# Patient Record
Sex: FEMALE | Race: AMERICAN INDIAN OR ALASKA NATIVE | Employment: FULL TIME | URBAN - METROPOLITAN AREA
[De-identification: names, ages, dates, MRNs, and addresses within clinical notes are randomized per-mention and may not be internally consistent; named-entity substitution may affect disease eponyms.]

---

## 2020-11-29 LAB
HPV ABSTRACT: NORMAL
PAP-ABSTRACT: NORMAL

## 2020-12-29 ENCOUNTER — TRANSFERRED RECORDS (OUTPATIENT)
Dept: HEALTH INFORMATION MANAGEMENT | Facility: CLINIC | Age: 65
End: 2020-12-29

## 2021-01-20 ENCOUNTER — OFFICE VISIT (OUTPATIENT)
Dept: OBGYN | Facility: CLINIC | Age: 66
End: 2021-01-20
Payer: COMMERCIAL

## 2021-01-20 VITALS
HEART RATE: 72 BPM | WEIGHT: 103 LBS | DIASTOLIC BLOOD PRESSURE: 74 MMHG | HEIGHT: 60 IN | BODY MASS INDEX: 20.22 KG/M2 | SYSTOLIC BLOOD PRESSURE: 118 MMHG

## 2021-01-20 DIAGNOSIS — B97.7 HPV IN FEMALE: Primary | ICD-10-CM

## 2021-01-20 DIAGNOSIS — Z12.4 CERVICAL CANCER SCREENING: ICD-10-CM

## 2021-01-20 PROCEDURE — 99202 OFFICE O/P NEW SF 15 MIN: CPT | Performed by: OBSTETRICS & GYNECOLOGY

## 2021-01-20 PROCEDURE — 87624 HPV HI-RISK TYP POOLED RSLT: CPT | Performed by: OBSTETRICS & GYNECOLOGY

## 2021-01-20 PROCEDURE — 88175 CYTOPATH C/V AUTO FLUID REDO: CPT | Performed by: OBSTETRICS & GYNECOLOGY

## 2021-01-20 RX ORDER — BUPROPION HYDROCHLORIDE 150 MG/1
150 TABLET ORAL
COMMUNITY
Start: 2020-09-09

## 2021-01-20 ASSESSMENT — MIFFLIN-ST. JEOR: SCORE: 933.7

## 2021-01-20 NOTE — PROGRESS NOTES
SUBJECTIVE:                                                   Amelia Grewal is a 65 year old female who presents to clinic today for the following health issue(s):  Patient presents with:  Other:  new patient, 2nd opinion on HPV          HPI:  Patient is new to us  Recent pap was normal but her hpv test was positive  They didn't do specific typing on the hpv so we don't know if it was 16,18 or other type.   She thinks this was possibly her first ever hpv test  Testing was done at [facility] so we don't have their prior records except for the one she brought today.   Patient had 3 c sections only.   She lives at [facility].     No LMP recorded. Patient is postmenopausal..     Patient is sexually active, No obstetric history on file..  Using menopause for contraception.    reports that she has never smoked. She has never used smokeless tobacco.    STD testing offered?  Declined    Health maintenance updated:  yes    Today's PHQ-2 Score: No flowsheet data found.  Today's PHQ-9 Score: No flowsheet data found.  Today's KERRI-7 Score: No flowsheet data found.    Problem list and histories reviewed & adjusted, as indicated.  Additional history: as documented.    Patient Active Problem List   Diagnosis     HPV in female     Past Surgical History:   Procedure Laterality Date      SECTION      x3     COSMETIC SURGERY  2004    breast implant, tummy tuck, multiple facial surgeries     EYE SURGERY       REPAIR PTOSIS BILATERAL  2014    Procedure: REPAIR PTOSIS BILATERAL;  BILATERAL UPPER LID PTOSIS AND MECHANICAL PTOSIS REPAIR;  Surgeon: Curt Moran MD;  Location:  [facility]     TUBAL LIGATION        Social History     Tobacco Use     Smoking status: Never Smoker     Smokeless tobacco: Never Used   Substance Use Topics     Alcohol use: Yes     Comment: rare           Current Outpatient Medications   Medication Sig     buPROPion (WELLBUTRIN XL) 150 MG 24 hr tablet Take 150 mg by mouth      Calcium Carb-Cholecalciferol 600-800 MG-UNIT CHEW Take 3 tablets by mouth     cyanocobalamin 1000 MCG/ML injection Inject 1 mL into the muscle every 30 days     testosterone (TESTOPEL) 75 MG subcutaneous implant pellet Inject 75 mg Subcutaneous     No current facility-administered medications for this visit.      Allergies   Allergen Reactions     Codeine      Keflex [Cephalexin]        ROS:  12 point review of systems negative other than symptoms noted below or in the HPI.  No urinary frequency or dysuria, bladder or kidney problems      OBJECTIVE:     /74   Pulse 72   Ht 1.524 m (5')   Wt 46.7 kg (103 lb)   BMI 20.12 kg/m    Body mass index is 20.12 kg/m .    Exam:  Constitutional:  Appearance: Well nourished, well developed alert, in no acute distress  Neurologic:  Mental Status:  Oriented X3.  Normal strength and tone, sensory exam grossly normal, mentation intact and speech normal.    Psychiatric:  Mentation appears normal and affect normal/bright.  Pelvic Exam:  External Genitalia:     Normal appearance for age, no discharge present, no tenderness present, no inflammatory lesions present, color normal  Vagina:     Normal vaginal vault without central or paravaginal defects, no discharge present, no inflammatory lesions present, no masses present  Bladder:     Nontender to palpation  Urethra:   Urethral Body:  Urethra palpation normal, urethra structural support normal   Urethral Meatus:  No erythema or lesions present  Cervix:     Appearance healthy, no lesions present, nontender to palpation, no bleeding present  Uterus:     Uterus: firm, normal sized and nontender, midplane in position.   Adnexa:     No adnexal tenderness present, no adnexal masses present  Perineum:     Perineum within normal limits, no evidence of trauma, no rashes or skin lesions present  Anus:     Anus within normal limits, no hemorrhoids present  Inguinal Lymph Nodes:     No lymphadenopathy present  Pubic Hair:     Normal  pubic hair distribution for age  Genitalia and Groin:     No rashes present, no lesions present, no areas of discoloration, no masses present       In-Clinic Test Results:  No results found for this or any previous visit (from the past 24 hour(s)).    ASSESSMENT/PLAN:                                                        ICD-10-CM    1. HPV in female  B97.7          We did pap and hpv again  Will test for hpv types so we can better plan if she needs a colposcopy  Discussed the transition in pap guidelines over the years and how hpv has changed things  If type 16 or 18, will need colposcopy  Otherwise, can repeat cotest in 1 years    Also discussed covid issues    20 minute visit to review her report, discussed hpv and pap guidelines, and do exam and charting      Mehnaz Lucas MD  [facility] [place]

## 2021-01-25 LAB
COPATH REPORT: NORMAL
PAP: NORMAL

## 2021-01-26 LAB
FINAL DIAGNOSIS: NORMAL
HPV HR 12 DNA CVX QL NAA+PROBE: NEGATIVE
HPV16 DNA SPEC QL NAA+PROBE: NEGATIVE
HPV18 DNA SPEC QL NAA+PROBE: NEGATIVE
SPECIMEN DESCRIPTION: NORMAL
SPECIMEN SOURCE CVX/VAG CYTO: NORMAL

## 2021-01-27 ENCOUNTER — PATIENT OUTREACH (OUTPATIENT)
Dept: OBGYN | Facility: CLINIC | Age: 66
End: 2021-01-27

## 2021-01-27 NOTE — LETTER
January 27, 2021      Amelia Gerwal  [address]  [place] [address]        Dear ,    We are happy to inform you that your recent Pap smear and Human Papillomavirus (HPV) test results are normal and negative.    It is recommended that you have your next Pap smear and Human Papillomavirus (HPV) test in 1 year. You will also need to return to the clinic every year for an annual wellness visit.    If you have additional questions regarding this result, please contact our office and we will be happy to assist you.      Sincerely,    Your [facility] Care Team

## 2021-02-28 ENCOUNTER — HEALTH MAINTENANCE LETTER (OUTPATIENT)
Age: 66
End: 2021-02-28

## 2021-10-03 ENCOUNTER — HEALTH MAINTENANCE LETTER (OUTPATIENT)
Age: 66
End: 2021-10-03

## 2022-01-05 ENCOUNTER — PATIENT OUTREACH (OUTPATIENT)
Dept: OBGYN | Facility: CLINIC | Age: 67
End: 2022-01-05
Payer: COMMERCIAL

## 2022-01-05 DIAGNOSIS — R87.810 CERVICAL HIGH RISK HPV (HUMAN PAPILLOMAVIRUS) TEST POSITIVE: ICD-10-CM

## 2022-01-05 NOTE — LETTER
January 5, 2022      Amelia Grewal  [address]  [place] [address]        Dear ,    This letter is to remind you that you are due for your follow-up Pap smear and Human Papillomavirus (HPV) test.    Please call [phone] to schedule your appointment at your earliest convenience.    If you have completed the appointment outside of the [facility] system, please have the records forwarded to our office. We will update your chart for your provider to review before your next annual wellness visit.     Thank you for choosing [facility]!      Sincerely,    Your [facility] Care Team

## 2022-02-07 NOTE — TELEPHONE ENCOUNTER
Patient due for Pap and HPV.    Reminder done today via telephone call.    Pt notified. [place] there. Done tracking.

## 2022-03-20 ENCOUNTER — HEALTH MAINTENANCE LETTER (OUTPATIENT)
Age: 67
End: 2022-03-20

## 2022-09-10 ENCOUNTER — HEALTH MAINTENANCE LETTER (OUTPATIENT)
Age: 67
End: 2022-09-10

## 2023-04-30 ENCOUNTER — HEALTH MAINTENANCE LETTER (OUTPATIENT)
Age: 68
End: 2023-04-30

## 2024-07-07 ENCOUNTER — HEALTH MAINTENANCE LETTER (OUTPATIENT)
Age: 69
End: 2024-07-07

## 2024-09-15 ENCOUNTER — HEALTH MAINTENANCE LETTER (OUTPATIENT)
Age: 69
End: 2024-09-15